# Patient Record
Sex: FEMALE | Race: WHITE | NOT HISPANIC OR LATINO | Employment: FULL TIME | URBAN - METROPOLITAN AREA
[De-identification: names, ages, dates, MRNs, and addresses within clinical notes are randomized per-mention and may not be internally consistent; named-entity substitution may affect disease eponyms.]

---

## 2021-07-01 ENCOUNTER — APPOINTMENT (OUTPATIENT)
Dept: GENERAL RADIOLOGY | Facility: CLINIC | Age: 31
End: 2021-07-01
Attending: EMERGENCY MEDICINE
Payer: COMMERCIAL

## 2021-07-01 ENCOUNTER — HOSPITAL ENCOUNTER (EMERGENCY)
Facility: CLINIC | Age: 31
Discharge: HOME | End: 2021-07-01
Attending: EMERGENCY MEDICINE | Admitting: EMERGENCY MEDICINE
Payer: COMMERCIAL

## 2021-07-01 VITALS
OXYGEN SATURATION: 100 % | BODY MASS INDEX: 43.39 KG/M2 | WEIGHT: 270 LBS | HEIGHT: 66 IN | HEART RATE: 98 BPM | DIASTOLIC BLOOD PRESSURE: 80 MMHG | SYSTOLIC BLOOD PRESSURE: 126 MMHG | TEMPERATURE: 99 F | RESPIRATION RATE: 18 BRPM

## 2021-07-01 DIAGNOSIS — M25.471 RIGHT ANKLE EFFUSION: ICD-10-CM

## 2021-07-01 PROCEDURE — 73610 X-RAY EXAM OF ANKLE: CPT | Mod: RT

## 2021-07-01 PROCEDURE — 250N000013 HC RX MED GY IP 250 OP 250 PS 637: Performed by: EMERGENCY MEDICINE

## 2021-07-01 PROCEDURE — 99283 EMERGENCY DEPT VISIT LOW MDM: CPT

## 2021-07-01 RX ORDER — IBUPROFEN 400 MG/1
400 TABLET, FILM COATED ORAL ONCE
Status: COMPLETED | OUTPATIENT
Start: 2021-07-01 | End: 2021-07-01

## 2021-07-01 RX ORDER — OXYCODONE AND ACETAMINOPHEN 5; 325 MG/1; MG/1
2 TABLET ORAL ONCE
Status: COMPLETED | OUTPATIENT
Start: 2021-07-01 | End: 2021-07-01

## 2021-07-01 RX ORDER — MORPHINE SULFATE 15 MG/1
7.5-15 TABLET ORAL EVERY 4 HOURS PRN
Qty: 10 TABLET | Refills: 0 | Status: SHIPPED | OUTPATIENT
Start: 2021-07-01

## 2021-07-01 RX ADMIN — IBUPROFEN 400 MG: 400 TABLET ORAL at 21:32

## 2021-07-01 RX ADMIN — OXYCODONE HYDROCHLORIDE AND ACETAMINOPHEN 2 TABLET: 5; 325 TABLET ORAL at 21:31

## 2021-07-01 ASSESSMENT — MIFFLIN-ST. JEOR: SCORE: 1946.46

## 2021-07-02 NOTE — ED PROVIDER NOTES
History     Chief Complaint:  Ankle Pain       HPI   Liz Hatfield is a 31 year old female who presents with concerns of right ankle pain.  The patient notes that her ankles have bothered her for some time, often being rather sore in the morning, but improving after she walks on them for a while.  She notes that she primarily works as a  with her feet dangling down and the ankles will swell frequently.  When she awoke this morning, she experienced her typical ankle pain, but she elected to go to a local department store to purchase some goods and anticipated that her ankles would improve.  The left ankle started to feel better but the right instead got worse.  She noted it slowly swelling up and becoming more uncomfortable as she was sitting at work with it hanging down.  She then got up to walk and found that it was much more uncomfortable.  She took some ibuprofen and elevated and iced it when she got home from work.  However, it did not feel improved, resulting in her visit to us today.  She denies any trauma.  She denies having it as well to this degree in the past.  She denies fevers or chills.  She denies a prior history of gout or change in her diet.  She otherwise denies other complaints at this juncture.    Allergies:  No Known Allergies     Medications:    No daily medications    Past Medical History:    No past medical history on file.    There are no active problems to display for this patient.       Past Surgical History:    No past surgical history on file.     Family History:    family history is not on file.    Social History:  Presents with friend.  Denies smoking.    PCP: No Ref-Primary, Physician     Review of Systems  Pertinent positives and negatives as above.  A 14-point review of systems was performed with all other systems reviewed as negative.      Physical Exam     Patient Vitals for the past 24 hrs:   BP Temp Temp src Pulse Resp SpO2 Height Weight   07/01/21 2022  "126/80 99  F (37.2  C) Oral 98 18 100 % 1.676 m (5' 6\") 122.5 kg (270 lb)        Physical Exam  MSK:  Focused exam of the right ankle shows no focal tenderness over the bones of the lateral or medial malleolus, fifth metatarsal, Lisfranc joint, or the digits.  There is an effusion present.  There is slight warmth about the ankle compared to the left.  However, there is no redness.  The patient allows passive range of motion with minimal discomfort, though discomfort on extremes of range of motion.  She does have pain on active range of motion, chiefly to the medial aspect of the joint.    SKIN:  Warm and dry with strong DP pulse and normal capillary refill.  No associated redness or skin lesion.    NEURO:  Normal sensation throughout the foot and ankle.  Strength limited secondary to pain.    PSYCH:  Normal affect    Emergency Department Course     Imaging:    Ankle XR, G/E 3 views, right   Final Result   IMPRESSION: Moderate sized ankle joint effusion. 0.5 cm lucency in the   medial talar dome consistent with overlying chondromalacia. Mild   hypertrophic change in the ankle joint. The ankle mortise is intact.   Mild degenerative changes in the talonavicular joint. No fractures are   evident.       ROSAURA UMANZOR MD             SYSTEM ID:  [id number]           Interventions:  Medications   oxyCODONE-acetaminophen (PERCOCET) 5-325 MG per tablet 2 tablet (2 tablets Oral Given 7/1/21 2131)   ibuprofen (ADVIL/MOTRIN) tablet 400 mg (400 mg Oral Given 7/1/21 2132)        Emergency Department Course:  Past medical records, nursing notes, and vitals reviewed.  I performed an exam of the patient and obtained history, as documented above.      Impression & Plan       Medical Decision Making:  This 31-year-old presents to us with atraumatic right ankle pain and swelling.  X-ray was obtained which does show evidence of arthritis with hypertrophic changes of the joint along with a chondromalacia.  An effusion is present both " on exam and on the x-ray.    I spoke with the patient at length of how to proceed.  I explained that I do not feel that there are concerns for a missed fracture.  I have no concerns for DVT or arterial issue.  She is otherwise neurologically intact.  With her daily ankle discomfort, worse after walking about a department store for significant period, I have to wonder if this more represents an effusion from simple osteoarthritic issues.  However, I explained that I cannot definitively rule out this representing an early septic joint or even a gouty or pseudogout arthritis without an arthrocentesis.  The patient was very nervous about undergoing this procedure despite me explaining it completely.  I also explained that I would be unable to inject cortisone and that there would be delay with her staying here until we would get the cell count and differential back.  Being given an alternative choice of treating this with anti-inflammatories, ice, compression and elevation along with urgent orthopedic follow-up tomorrow in clinic, she prefers this option, as if she is going to undergo any type of an arthrocentesis she would like to have some type of pain injection into the joint as well, something I am unable to provide here.  I do believe that she and her friend understand that there is diagnostic uncertainty here, and that 1 of these more serious causes of an effusion is possible though is low in likelihood.  She was advised to return to us immediately if she develops a fever, increasing redness, increasing warmth, or if she changes her mind about undergoing a more thorough investigation in the emergency department.  All parties are comfortable with this and she was discharged with crutches and a limited supply of something stronger for pain.    Diagnosis:    ICD-10-CM    1. Right ankle effusion  M25.471         Discharge Medications:     Medication List      Started    morphine 15 MG IR tablet  Commonly known as:  MSIR  7.5-15 mg, Oral, EVERY 4 HOURS PRN             7/1/2021   Chad Trierweiler, MD Trierweiler, Chad A, MD  07/01/21 7229

## 2021-07-02 NOTE — DISCHARGE INSTRUCTIONS
As discussed, the exact cause of your ankle pain and swelling is unclear.  We discussed the possibility of this representing an infection or gout and considered the pros and cons of undergoing a joint tap today.  You have elected not to pursue this, rather to follow-up with orthopedics tomorrow morning.  This is reasonable, though it is imperative that you return to us immediately if you develop any associated redness, fever, or worsening of your pain so we can intervene here.  Otherwise, continue with Motrin/Tylenol and the prescribed morphine.  Call tomorrow morning for an urgent appointment with our orthopedic colleagues.

## 2024-01-15 RX ORDER — VALACYCLOVIR HYDROCHLORIDE 1 G/1
TABLET, FILM COATED ORAL
Qty: 12 TABLET | Refills: 0 | OUTPATIENT
Start: 2024-01-15

## 2025-04-05 ENCOUNTER — HOSPITAL ENCOUNTER (EMERGENCY)
Facility: CLINIC | Age: 35
Discharge: HOME | End: 2025-04-06
Attending: EMERGENCY MEDICINE | Admitting: EMERGENCY MEDICINE
Payer: COMMERCIAL

## 2025-04-05 ENCOUNTER — APPOINTMENT (OUTPATIENT)
Dept: GENERAL RADIOLOGY | Facility: CLINIC | Age: 35
End: 2025-04-05
Attending: EMERGENCY MEDICINE
Payer: COMMERCIAL

## 2025-04-05 DIAGNOSIS — J45.901 REACTIVE AIRWAY DISEASE WITH ACUTE EXACERBATION, UNSPECIFIED ASTHMA SEVERITY, UNSPECIFIED WHETHER PERSISTENT: ICD-10-CM

## 2025-04-05 DIAGNOSIS — J06.9 UPPER RESPIRATORY TRACT INFECTION, UNSPECIFIED TYPE: Primary | ICD-10-CM

## 2025-04-05 DIAGNOSIS — J40 BRONCHITIS: ICD-10-CM

## 2025-04-05 LAB
ATRIAL RATE - MUSE: 73 BPM
BASOPHILS # BLD AUTO: 0.1 10E3/UL (ref 0–0.2)
BASOPHILS NFR BLD AUTO: 1 %
DIASTOLIC BLOOD PRESSURE - MUSE: NORMAL MMHG
EOSINOPHIL # BLD AUTO: 0.4 10E3/UL (ref 0–0.7)
EOSINOPHIL NFR BLD AUTO: 6 %
ERYTHROCYTE [DISTWIDTH] IN BLOOD BY AUTOMATED COUNT: 12 % (ref 10–15)
FLUAV RNA SPEC QL NAA+PROBE: NEGATIVE
FLUBV RNA RESP QL NAA+PROBE: NEGATIVE
HCT VFR BLD AUTO: 36.7 % (ref 35–47)
HGB BLD-MCNC: 13.1 G/DL (ref 11.7–15.7)
IMM GRANULOCYTES # BLD: 0 10E3/UL
IMM GRANULOCYTES NFR BLD: 0 %
INTERPRETATION ECG - MUSE: NORMAL
LYMPHOCYTES # BLD AUTO: 2.6 10E3/UL (ref 0.8–5.3)
LYMPHOCYTES NFR BLD AUTO: 39 %
MCH RBC QN AUTO: 30.7 PG (ref 26.5–33)
MCHC RBC AUTO-ENTMCNC: 35.7 G/DL (ref 31.5–36.5)
MCV RBC AUTO: 86 FL (ref 78–100)
MONOCYTES # BLD AUTO: 0.7 10E3/UL (ref 0–1.3)
MONOCYTES NFR BLD AUTO: 10 %
NEUTROPHILS # BLD AUTO: 2.9 10E3/UL (ref 1.6–8.3)
NEUTROPHILS NFR BLD AUTO: 44 %
NRBC # BLD AUTO: 0 10E3/UL
NRBC BLD AUTO-RTO: 0 /100
P AXIS - MUSE: 45 DEGREES
PLATELET # BLD AUTO: 260 10E3/UL (ref 150–450)
PR INTERVAL - MUSE: 144 MS
QRS DURATION - MUSE: 74 MS
QT - MUSE: 388 MS
QTC - MUSE: 427 MS
R AXIS - MUSE: 65 DEGREES
RBC # BLD AUTO: 4.27 10E6/UL (ref 3.8–5.2)
RSV RNA SPEC NAA+PROBE: NEGATIVE
SARS-COV-2 RNA RESP QL NAA+PROBE: NEGATIVE
SYSTOLIC BLOOD PRESSURE - MUSE: NORMAL MMHG
T AXIS - MUSE: 51 DEGREES
VENTRICULAR RATE- MUSE: 73 BPM
WBC # BLD AUTO: 6.8 10E3/UL (ref 4–11)

## 2025-04-05 PROCEDURE — 36415 COLL VENOUS BLD VENIPUNCTURE: CPT | Performed by: EMERGENCY MEDICINE

## 2025-04-05 PROCEDURE — 71046 X-RAY EXAM CHEST 2 VIEWS: CPT

## 2025-04-05 PROCEDURE — 85025 COMPLETE CBC W/AUTO DIFF WBC: CPT | Performed by: EMERGENCY MEDICINE

## 2025-04-05 PROCEDURE — 87637 SARSCOV2&INF A&B&RSV AMP PRB: CPT | Performed by: EMERGENCY MEDICINE

## 2025-04-05 PROCEDURE — 94640 AIRWAY INHALATION TREATMENT: CPT

## 2025-04-05 PROCEDURE — 250N000009 HC RX 250: Performed by: EMERGENCY MEDICINE

## 2025-04-05 PROCEDURE — 99285 EMERGENCY DEPT VISIT HI MDM: CPT | Mod: 25

## 2025-04-05 PROCEDURE — 250N000011 HC RX IP 250 OP 636: Mod: JZ | Performed by: EMERGENCY MEDICINE

## 2025-04-05 PROCEDURE — 80048 BASIC METABOLIC PNL TOTAL CA: CPT | Performed by: EMERGENCY MEDICINE

## 2025-04-05 PROCEDURE — 96374 THER/PROPH/DIAG INJ IV PUSH: CPT

## 2025-04-05 PROCEDURE — 93005 ELECTROCARDIOGRAM TRACING: CPT

## 2025-04-05 RX ORDER — IPRATROPIUM BROMIDE AND ALBUTEROL SULFATE 2.5; .5 MG/3ML; MG/3ML
3 SOLUTION RESPIRATORY (INHALATION) ONCE
Status: COMPLETED | OUTPATIENT
Start: 2025-04-05 | End: 2025-04-05

## 2025-04-05 RX ORDER — METHYLPREDNISOLONE SODIUM SUCCINATE 125 MG/2ML
125 INJECTION INTRAMUSCULAR; INTRAVENOUS ONCE
Status: COMPLETED | OUTPATIENT
Start: 2025-04-05 | End: 2025-04-05

## 2025-04-05 RX ADMIN — METHYLPREDNISOLONE SODIUM SUCCINATE 125 MG: 125 INJECTION, POWDER, FOR SOLUTION INTRAMUSCULAR; INTRAVENOUS at 23:06

## 2025-04-05 RX ADMIN — IPRATROPIUM BROMIDE AND ALBUTEROL SULFATE 3 ML: .5; 3 SOLUTION RESPIRATORY (INHALATION) at 23:17

## 2025-04-05 ASSESSMENT — COLUMBIA-SUICIDE SEVERITY RATING SCALE - C-SSRS
1. IN THE PAST MONTH, HAVE YOU WISHED YOU WERE DEAD OR WISHED YOU COULD GO TO SLEEP AND NOT WAKE UP?: NO
6. HAVE YOU EVER DONE ANYTHING, STARTED TO DO ANYTHING, OR PREPARED TO DO ANYTHING TO END YOUR LIFE?: NO
2. HAVE YOU ACTUALLY HAD ANY THOUGHTS OF KILLING YOURSELF IN THE PAST MONTH?: NO

## 2025-04-05 ASSESSMENT — ACTIVITIES OF DAILY LIVING (ADL): ADLS_ACUITY_SCORE: 41

## 2025-04-06 VITALS
BODY MASS INDEX: 43.95 KG/M2 | HEART RATE: 80 BPM | WEIGHT: 280 LBS | HEIGHT: 67 IN | TEMPERATURE: 98.7 F | OXYGEN SATURATION: 95 % | RESPIRATION RATE: 18 BRPM | DIASTOLIC BLOOD PRESSURE: 57 MMHG | SYSTOLIC BLOOD PRESSURE: 99 MMHG

## 2025-04-06 LAB
ANION GAP SERPL CALCULATED.3IONS-SCNC: 10 MMOL/L (ref 7–15)
BUN SERPL-MCNC: 8.6 MG/DL (ref 6–20)
CALCIUM SERPL-MCNC: 8.9 MG/DL (ref 8.8–10.4)
CHLORIDE SERPL-SCNC: 102 MMOL/L (ref 98–107)
CREAT SERPL-MCNC: 0.82 MG/DL (ref 0.51–0.95)
EGFRCR SERPLBLD CKD-EPI 2021: >90 ML/MIN/1.73M2
GLUCOSE SERPL-MCNC: 107 MG/DL (ref 70–99)
HCO3 SERPL-SCNC: 25 MMOL/L (ref 22–29)
POTASSIUM SERPL-SCNC: 3.8 MMOL/L (ref 3.4–5.3)
SODIUM SERPL-SCNC: 137 MMOL/L (ref 135–145)

## 2025-04-06 RX ORDER — ALBUTEROL SULFATE 90 UG/1
2 INHALANT RESPIRATORY (INHALATION) EVERY 6 HOURS PRN
Qty: 18 G | Refills: 0 | Status: SHIPPED | OUTPATIENT
Start: 2025-04-06

## 2025-04-06 RX ORDER — INHALER, ASSIST DEVICES
SPACER (EA) MISCELLANEOUS
Qty: 1 EACH | Refills: 0 | Status: SHIPPED | OUTPATIENT
Start: 2025-04-06

## 2025-04-06 RX ORDER — PREDNISONE 20 MG/1
TABLET ORAL
Qty: 10 TABLET | Refills: 0 | Status: SHIPPED | OUTPATIENT
Start: 2025-04-06

## 2025-04-06 NOTE — ED TRIAGE NOTES
States has had cough, SOB, mucus started last Wednesday. Some body aches back pain with coughing. Temp 99 at home. Also recently quit smoking, does vape. Took mucinex around 1600   Triage Assessment (Adult)       Row Name 04/05/25 2222          Triage Assessment    Airway WDL WDL        Respiratory WDL    Respiratory WDL X;cough     Cough Frequency frequent        Skin Circulation/Temperature WDL    Skin Circulation/Temperature WDL WDL        Cardiac WDL    Cardiac WDL WDL        Peripheral/Neurovascular WDL    Peripheral Neurovascular WDL WDL        Cognitive/Neuro/Behavioral WDL    Cognitive/Neuro/Behavioral WDL WDL

## 2025-04-06 NOTE — ED NOTES
Pt ambulated 100 ft unassisted with oximeter. O2 saturation 97% at rest. O2 saturations remained 93-96% while ambulating.

## 2025-04-06 NOTE — DISCHARGE INSTRUCTIONS
Please follow-up with your primary care provider and/or specialist regarding your visit to the ER today.    Please return to the emergency department should you experience any of the symptoms we specifically discussed, including but not limited to recurrence or worsening of your symptoms, or development of any new and concerning symptoms such as fever, worsening shortness of breath, chest pain, or change in your sputum color.    Please use medication as instructed on bottle.    Please schedule your inhaler every 6 hours for the next 24 hours and you may space it out to as needed afterwards.

## 2025-04-06 NOTE — ED PROVIDER NOTES
"  Emergency Department Note      History of Present Illness     Chief Complaint   Shortness of Breath and Cough    HPI   Liz Hatfield is a 35 year old female with a history of asthma presenting with shortness of breath and cough. The patient reports that she has been sick for the past three days and has gotten worse int he past two days. She feels wheezing and gurgling in her lungs. The patient reports brown/yellow mucous.  She spat up this mucus in the middle night last night. She endorses rhinorrhea, congestion, feeling achy, and mucous in throat. The patient denies fever, chest pain, abdominal pain, nausea, vomiting, common illness or chance of pregnancy.  Denies any estrogen use.  No recent surgeries.  No new leg swelling.  Patient reports that she is more easily short of breath with just walking short distances.  Patient reports recently stopping cigarette use and is concerned about COPD.    Independent Historian   None    Review of External Notes   4/5/25 - nursing note     Past Medical History     Medical History and Problem List   Asthma   ADHD  RIGOBERTO  Anxiety and depression   Macrocytosis   Hypertriglyceridemia     Medications   Morphine   Valtrex   Flexeril   Medrol     Surgical History   Cholecystectomy   Hernia repair     Physical Exam     Patient Vitals for the past 24 hrs:   BP Temp Temp src Pulse Resp SpO2 Height Weight   04/06/25 0000 104/57 -- -- 77 -- 95 % -- --   04/05/25 2345 105/67 -- -- 79 -- 97 % -- --   04/05/25 2330 -- -- -- -- -- 99 % -- --   04/05/25 2315 100/69 -- -- 75 -- 97 % -- --   04/05/25 2232 -- 98.7  F (37.1  C) Oral -- -- -- -- --   04/05/25 2231 118/80 -- Oral 79 18 97 % 1.702 m (5' 7\") 127 kg (280 lb)     Physical Exam  Constitutional:       Appearance: Normal appearance.   HENT:      Head: Normocephalic and atraumatic.   Eyes:      Extraocular Movements: Extraocular movements intact.      Conjunctiva/sclera: Conjunctivae normal.   Cardiovascular:      Rate and Rhythm: " Normal rate and regular rhythm.   Pulmonary:      Effort: Pulmonary effort is normal. No respiratory distress.      Breath sounds: Slight coarse breath sounds bilaterally with end expiratory wheezing left lower lung greater than right.  Abdominal:      General: Abdomen is flat. There is no distension.      Palpations: Abdomen is soft.      Tenderness: There is no abdominal tenderness.   Musculoskeletal:      Cervical back: Normal range of motion. No rigidity.       Right lower leg: No edema.      Left lower leg: No edema.   Skin:     General: Skin is warm and dry.   Neurological:      General: No focal deficit present.      Mental Status: Alert and oriented to person, place, and time.   Psychiatric:         Mood and Affect: Mood normal.         Behavior: Behavior normal.    Diagnostics     Lab Results   Labs Ordered and Resulted from Time of ED Arrival to Time of ED Departure   BASIC METABOLIC PANEL - Abnormal       Result Value    Sodium 137      Potassium 3.8      Chloride 102      Carbon Dioxide (CO2) 25      Anion Gap 10      Urea Nitrogen 8.6      Creatinine 0.82      GFR Estimate >90      Calcium 8.9      Glucose 107 (*)    INFLUENZA A/B, RSV AND SARS-COV2 PCR - Normal    Influenza A PCR Negative      Influenza B PCR Negative      RSV PCR Negative      SARS CoV2 PCR Negative     CBC WITH PLATELETS AND DIFFERENTIAL    WBC Count 6.8      RBC Count 4.27      Hemoglobin 13.1      Hematocrit 36.7      MCV 86      MCH 30.7      MCHC 35.7      RDW 12.0      Platelet Count 260      % Neutrophils 44      % Lymphocytes 39      % Monocytes 10      % Eosinophils 6      % Basophils 1      % Immature Granulocytes 0      NRBCs per 100 WBC 0      Absolute Neutrophils 2.9      Absolute Lymphocytes 2.6      Absolute Monocytes 0.7      Absolute Eosinophils 0.4      Absolute Basophils 0.1      Absolute Immature Granulocytes 0.0      Absolute NRBCs 0.0         Imaging   XR Chest 2 Views   Final Result   IMPRESSION: Heart size and  pulmonary vascularity normal. The lungs are clear. Multiple clips anterior abdominal wall.          EKG   ECG results from 04/05/25   EKG 12-lead, tracing only     Value    Systolic Blood Pressure     Diastolic Blood Pressure     Ventricular Rate 73    Atrial Rate 73    KY Interval 144    QRS Duration 74        QTc 427    P Axis 45    R AXIS 65    T Axis 51    Interpretation ECG      Sinus rhythm  Normal ECG  No previous ECGs available    Reviewed by Tre Loza DO         Independent Interpretation   See ED course.    ED Course      Medications Administered   Medications   ipratropium - albuterol 0.5 mg/2.5 mg/3 mL (DUONEB) neb solution 3 mL (3 mLs Nebulization $Given 4/5/25 2317)   methylPREDNISolone Na Suc (solu-MEDROL) injection 125 mg (125 mg Intravenous $Given 4/5/25 2306)       Procedures   Procedures     Discussion of Management   None    ED Course   ED Course as of 04/06/25 0019   Sat Apr 05, 2025   2250 I obtained the history and examined the patient as noted above.      2318 WBC: 6.8  Reassuring.   2318 EKG 12-lead, tracing only  Normal sinus rhythm.  Rate of 73.  Normal [place] and QRS.  Normal QTc.  No acute ST elevation or depression.   2333 XR Chest 2 Views  On my independent interpretation of chest x-ray, there is no obvious focal opacity, mediastinal widening, or pneumothorax.  Some peribronchial cuffing and increased interstitial markings near the perihilar region.  Findings suspicious for acute viral URI.   Sun Apr 06, 2025   0005 I rechecked and updated the patient. Comfortable with plan for discharge.         Additional Documentation  None    Medical Decision Making / Diagnosis     CMS Diagnoses: None    MIPS       None    [facility]   Liz Hatfield is a 35-year-old female as described above presents to the emergency department with flulike illness and URI symptoms for the past 3 days.  Patient hemodynamically stable at time of evaluation.  Afebrile.  Symptoms today consistent with upper  respiratory tract infection.  While symptoms more likely viral in nature, will obtain chest x-ray to evaluate for secondary bacterial pneumonia given possible brown sputum production.  Patient does have a smoking history that recently she has just.  On lung auscultation, there is some subtle end expiratory wheezing left greater than right lung.  Distant history of exercise-induced asthma.  Suspect reactive airway disease/bronchitis versus potential early developing COPD/asthma exacerbation.  Will trial nebulizer and steroid treatment.  Viral swab.  Patient otherwise has no chest pain complaints at this time.  Shortness of breath appears to be related to acute upper respiratory tract infection.  Patient is PERC negative for pulmonary embolism.  Will obtain screening EKG for dyspnea.  I do not believe cardiac enzyme testing is necessary at this time especially given patient is not having any chest pain complaints and symptoms more consistent with a upper respiratory tract infection.  Low suspicion for pericarditis/myocarditis. Discussed care plan with patient voiced understanding and agreement with plan.  Answered all questions.  Additional workup and orders as is in chart.     Ultimately, work up shows negative chest x-ray for bacterial pneumonia.  No significant leukocytosis to suggest severe systemic infection.  While viral swab negative today, certainly possible URI triggered by other upper respiratory viral illness not on our respiratory panel.  Blood chemistries unremarkable.  EKG unremarkable.  Suspect symptoms today likely secondary to acute bronchitis/reactive airway type disease (I.e. asthma/undiagnosed COPD exacerbation).  Advised smoking cessation.  Will discharge home with albuterol inhaler in conjunction with short course steroids.  Advise scheduling of inhaler usage every 6 hours for the next 24 hours and space out to as needed afterwards.  Continue Mucinex.  Discussed return precautions.  Answered all  questions.  Patient voiced understanding and agreement with plan.      Please refer to ED course above as part of continuation of MDM for details on the patient's treatment course and any potential changes or updates beyond my initial evaluation and MDM creation.    Disposition   The patient was discharged.     Diagnosis     ICD-10-CM    1. Upper respiratory tract infection, unspecified type  J06.9       2. Bronchitis  J40       3. Reactive airway disease with acute exacerbation, unspecified asthma severity, unspecified whether persistent  J45.901            Discharge Medications   New Prescriptions    ALBUTEROL (PROAIR HFA/PROVENTIL HFA/VENTOLIN HFA) 108 (90 BASE) MCG/ACT INHALER    Inhale 2 puffs into the lungs every 6 hours as needed for shortness of breath, wheezing or cough.    PREDNISONE (DELTASONE) 20 MG TABLET    Take two tablets (= 40mg) each day for 5 (five) days    SPACER (OPTICHAMBER JAY) HOLDING CHAMBER    1 spacer         Scribe Disclosure:  I, Mishel Rose, am serving as a scribe at 11:18 PM on 4/5/2025 to document services personally performed by Tre Loza DO based on my observations and the provider's statements to me.        Tre Loza DO  04/06/25 0021